# Patient Record
Sex: FEMALE | Race: WHITE | NOT HISPANIC OR LATINO | URBAN - METROPOLITAN AREA
[De-identification: names, ages, dates, MRNs, and addresses within clinical notes are randomized per-mention and may not be internally consistent; named-entity substitution may affect disease eponyms.]

---

## 2017-06-27 ENCOUNTER — APPOINTMENT (OUTPATIENT)
Age: 52
Setting detail: DERMATOLOGY
End: 2017-06-29

## 2017-06-27 DIAGNOSIS — L85.3 XEROSIS CUTIS: ICD-10-CM

## 2017-06-27 DIAGNOSIS — L40.8 OTHER PSORIASIS: ICD-10-CM

## 2017-06-27 DIAGNOSIS — B07.8 OTHER VIRAL WARTS: ICD-10-CM

## 2017-06-27 DIAGNOSIS — L11.1 TRANSIENT ACANTHOLYTIC DERMATOSIS [GROVER]: ICD-10-CM

## 2017-06-27 DIAGNOSIS — Q828 OTHER SPECIFIED ANOMALIES OF SKIN: ICD-10-CM

## 2017-06-27 DIAGNOSIS — Q819 OTHER SPECIFIED ANOMALIES OF SKIN: ICD-10-CM

## 2017-06-27 DIAGNOSIS — Q826 OTHER SPECIFIED ANOMALIES OF SKIN: ICD-10-CM

## 2017-06-27 DIAGNOSIS — E85.4 ORGAN-LIMITED AMYLOIDOSIS: ICD-10-CM

## 2017-06-27 DIAGNOSIS — D22 MELANOCYTIC NEVI: ICD-10-CM

## 2017-06-27 DIAGNOSIS — Z71.89 OTHER SPECIFIED COUNSELING: ICD-10-CM

## 2017-06-27 DIAGNOSIS — K13.0 DISEASES OF LIPS: ICD-10-CM

## 2017-06-27 DIAGNOSIS — L73.8 OTHER SPECIFIED FOLLICULAR DISORDERS: ICD-10-CM

## 2017-06-27 PROBLEM — E85.8 OTHER AMYLOIDOSIS: Status: ACTIVE | Noted: 2017-06-27

## 2017-06-27 PROBLEM — Q82.8 OTHER SPECIFIED CONGENITAL MALFORMATIONS OF SKIN: Status: ACTIVE | Noted: 2017-06-27

## 2017-06-27 PROBLEM — D22.71 MELANOCYTIC NEVI OF RIGHT LOWER LIMB, INCLUDING HIP: Status: ACTIVE | Noted: 2017-06-27

## 2017-06-27 PROBLEM — D22.39 MELANOCYTIC NEVI OF OTHER PARTS OF FACE: Status: ACTIVE | Noted: 2017-06-27

## 2017-06-27 PROCEDURE — OTHER PRESCRIPTION: OTHER

## 2017-06-27 PROCEDURE — 99203 OFFICE O/P NEW LOW 30 MIN: CPT

## 2017-06-27 PROCEDURE — OTHER PRODUCT LINE (OFFICE PRODUCTS): OTHER

## 2017-06-27 PROCEDURE — OTHER COUNSELING: OTHER

## 2017-06-27 RX ORDER — CALCIPOTRIENE 50 UG/G
CREAM TOPICAL
Qty: 1 | Refills: 0 | Status: ERX | COMMUNITY
Start: 2017-06-27

## 2017-06-27 RX ORDER — CLOBETASOL PROPIONATE 0.5 MG/G
CREAM TOPICAL
Qty: 1 | Refills: 0 | Status: ERX | COMMUNITY
Start: 2017-06-27

## 2017-06-27 RX ORDER — CALCIPOTRIENE AND BETAMETHASONE DIPROPIONATE 50; .5 UG/G; MG/G
SUSPENSION TOPICAL
Qty: 1 | Refills: 3 | Status: ERX | COMMUNITY
Start: 2017-06-27

## 2017-06-27 ASSESSMENT — LOCATION SIMPLE DESCRIPTION DERM
LOCATION SIMPLE: RIGHT CHEEK
LOCATION SIMPLE: LEFT FOREHEAD
LOCATION SIMPLE: RIGHT KNEE
LOCATION SIMPLE: RIGHT GREAT TOE
LOCATION SIMPLE: LEFT ELBOW
LOCATION SIMPLE: RIGHT MIDDLE FINGER
LOCATION SIMPLE: RIGHT 2ND TOE
LOCATION SIMPLE: RIGHT FOOT
LOCATION SIMPLE: LOWER BACK
LOCATION SIMPLE: UPPER BACK
LOCATION SIMPLE: ABDOMEN
LOCATION SIMPLE: RIGHT ELBOW
LOCATION SIMPLE: LEFT KNEE

## 2017-06-27 ASSESSMENT — LOCATION ZONE DERM
LOCATION ZONE: TOE
LOCATION ZONE: ARM
LOCATION ZONE: LEG
LOCATION ZONE: FACE
LOCATION ZONE: TRUNK
LOCATION ZONE: FEET
LOCATION ZONE: FINGER

## 2017-06-27 ASSESSMENT — LOCATION DETAILED DESCRIPTION DERM
LOCATION DETAILED: LEFT KNEE
LOCATION DETAILED: RIGHT CENTRAL MALAR CHEEK
LOCATION DETAILED: PERIUMBILICAL SKIN
LOCATION DETAILED: RIGHT MEDIAL GREAT TOE
LOCATION DETAILED: RIGHT DISTAL RADIAL DORSAL MIDDLE FINGER
LOCATION DETAILED: SUPERIOR LUMBAR SPINE
LOCATION DETAILED: LEFT INFERIOR MEDIAL FOREHEAD
LOCATION DETAILED: INFERIOR THORACIC SPINE
LOCATION DETAILED: RIGHT KNEE
LOCATION DETAILED: LEFT ELBOW
LOCATION DETAILED: RIGHT SUPERIOR CENTRAL MALAR CHEEK
LOCATION DETAILED: 1ST WEBSPACE RIGHT FOOT
LOCATION DETAILED: EPIGASTRIC SKIN
LOCATION DETAILED: RIGHT MEDIAL 2ND TOE
LOCATION DETAILED: RIGHT ELBOW

## 2017-06-27 ASSESSMENT — BSA RASH: BSA RASH: 9

## 2017-06-27 ASSESSMENT — PGA PSORIASIS: PGA PSORIASIS: MILD (MILD PLAQUE ELEVATION, LIGHT ERYTHEMA, FINE SCALE PREDOMINATES)

## 2017-06-27 ASSESSMENT — PAIN INTENSITY VAS: HOW INTENSE IS YOUR PAIN 0 BEING NO PAIN, 10 BEING THE MOST SEVERE PAIN POSSIBLE?: NO PAIN

## 2017-06-27 NOTE — PROCEDURE: PRODUCT LINE (OFFICE PRODUCTS)
Product 21 Application Directions: Apply nightly to affected area for two week, take two weeks off then repeat
Product 75 Price (In Dollars - Numeric Only, No Special Characters Or $): 0.00
Product 11 Units: 0
Product 5 Application Directions: Apply to nose, R cheek and nose  every Monday, Wednesday and Friday.
Product 42 Application Directions: Apply to acne zones every morning
Product 8 Price (In Dollars - Numeric Only, No Special Characters Or $): 30
Product 32 Application Directions: Apply clobetasol sol to scalp nightly for two weeks then as needed for flares
Name Of Product 11: Tacrolimus oint
Product 54 Application Directions: Apply to affected areas on face 2-3x a month
Name Of Product 21: Imiquimod
Name Of Product 3: Clindamycin lotion
Name Of Product 71: Metronidazole gel
Product 32 Price (In Dollars - Numeric Only, No Special Characters Or $): 40
Render Product Pricing In Note: Yes
Product 27 Application Directions: Apply to affected area daily
Name Of Product 4: Retin-a .05
Name Of Product 43: Tazoratene.05
Name Of Product 54: Tacrolimus
Name Of Product 22: Clobetasol cream
Name Of Product 1: Tazoratene .05
Product 22 Application Directions: Apply to legs twice daily for two weeks
Name Of Product 27: Spironolactone gel
Product 9 Price (In Dollars - Numeric Only, No Special Characters Or $): 60
Detail Level: Zone
Product 53 Application Directions: Apply to face once daily
Product 8 Application Directions: Apply to affected area bid for two weeks
Name Of Product 5: Imiquimod 5%
Product 14 Application Directions: Apply to scalp nightly for 2-3 week
Name Of Product 9: Protopic oinment 0.1%
Name Of Product 23: Mupirocin
Name Of Product 42: Dapsone gel
Name Of Product 8: Hydrocortisone 2.5
Product 2 Application Directions: Apply to affected area at bedtime for two weeks then as needed for flares
Product 13 Application Directions: Apply pea size to affected areas 3x a week
Name Of Product 41: Tretinoin .025
Name Of Product 31: Tretinoin .1
Product 33 Application Directions: Apply 1-2x daily for 2-3 weeks
Name Of Product 14: Clobetasol sol
Product 4 Application Directions: Apply pea size to acne zones nightly
Product 2 Units: 1
Name Of Product 15: Ketoconazole
Product 12 Application Directions: Apply pea size nightly
Product 43 Application Directions: Apply to acne zones nightly
Product 15 Application Directions: Apply to affected area 1-2x a day for flares
Product 7 Application Directions: Apply bid for two weeks
Product 9 Application Directions: Apply to eyelid twice a day x 2 weeks as needed
Product 10 Application Directions: Apply to the affected areas on ears 2-3x
Product 51 Application Directions: Apply to scalp nightly for two weeks then PRN flares
Product 11 Application Directions: Apply 1-2x a day for 2 weeks then as needed for flares
Send Charges To Patient Encounter: No
Product 3 Application Directions: Apply to legs 1-2x daily
Product 41 Application Directions: Apply to affected area nightly
Product 52 Application Directions: Apply bid for 1-2 weeks
Name Of Product 13: Tretinoin .05
Product 3 Price (In Dollars - Numeric Only, No Special Characters Or $): 70
Product 6 Application Directions: Apply to affected area bid  for two weeks
Product 71 Application Directions: Apply to face daily
Product 23 Application Directions: Apply twice daily for two weeks
Product 1 Application Directions: Apply to face nightly two weeks prior to pdt

## 2017-06-27 NOTE — HPI: EVALUATION OF SKIN LESION(S)
How Severe Are Your Spot(S)?: mild
Have Your Spot(S) Been Treated In The Past?: has not been treated
Hpi Title: Evaluation of Skin Lesions
Additional History: PATIENT HAS A FAMILY HISTORY OF BCC AND WOULD LIKE TO BE EVALUATED.

## 2022-04-04 ENCOUNTER — OFFICE VISIT (OUTPATIENT)
Dept: URBAN - METROPOLITAN AREA CLINIC 33 | Facility: CLINIC | Age: 57
End: 2022-04-04

## 2022-04-04 DIAGNOSIS — H02.881 MEIBOMIAN GLAND DYSFUNCTION RIGHT UPPER EYELID: Primary | ICD-10-CM

## 2022-04-04 DIAGNOSIS — H02.884 MEIBOMIAN GLAND DYSFUNCTION LEFT UPPER EYELID: ICD-10-CM

## 2022-04-04 PROCEDURE — 99202 OFFICE O/P NEW SF 15 MIN: CPT | Performed by: OPTOMETRIST

## 2022-04-04 ASSESSMENT — INTRAOCULAR PRESSURE
OS: 18
OD: 18

## 2022-04-04 NOTE — IMPRESSION/PLAN
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 Plan: Discussed warm compresses, eyelid scrubs, and artificial tears. No corneal abrasion.